# Patient Record
Sex: FEMALE | Race: WHITE | NOT HISPANIC OR LATINO | Employment: FULL TIME | URBAN - METROPOLITAN AREA
[De-identification: names, ages, dates, MRNs, and addresses within clinical notes are randomized per-mention and may not be internally consistent; named-entity substitution may affect disease eponyms.]

---

## 2023-08-31 ENCOUNTER — HOSPITAL ENCOUNTER (INPATIENT)
Facility: HOSPITAL | Age: 63
LOS: 2 days | Discharge: HOME | DRG: 084 | End: 2023-09-02
Attending: EMERGENCY MEDICINE | Admitting: SURGERY
Payer: MEDICARE

## 2023-08-31 DIAGNOSIS — S06.6X3A: Primary | ICD-10-CM

## 2023-08-31 DIAGNOSIS — I48.91 A-FIB: ICD-10-CM

## 2023-08-31 DIAGNOSIS — S02.31XA CLOSED FRACTURE OF RIGHT ORBITAL FLOOR, INITIAL ENCOUNTER: ICD-10-CM

## 2023-08-31 DIAGNOSIS — S02.92XA FACIAL FRACTURE DUE TO FALL, CLOSED, INITIAL ENCOUNTER: ICD-10-CM
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
## 2023-08-31 LAB
ALBUMIN SERPL-MCNC: 3.6 G/DL (ref 3.4–4.8)
ALBUMIN/GLOB SERPL: 1.2 RATIO (ref 1.1–2)
ALP SERPL-CCNC: 67 UNIT/L (ref 40–150)
ALT SERPL-CCNC: 15 UNIT/L (ref 0–55)
APTT PPP: 30 SECONDS (ref 23.2–33.7)
AST SERPL-CCNC: 27 UNIT/L (ref 5–34)
BASOPHILS # BLD AUTO: 0.13 X10(3)/MCL
BASOPHILS NFR BLD AUTO: 1.3 %
BILIRUB SERPL-MCNC: 0.4 MG/DL
BUN SERPL-MCNC: 17.6 MG/DL (ref 9.8–20.1)
CALCIUM SERPL-MCNC: 8.8 MG/DL (ref 8.4–10.2)
CHLORIDE SERPL-SCNC: 113 MMOL/L (ref 98–107)
CO2 SERPL-SCNC: 16 MMOL/L (ref 23–31)
CREAT SERPL-MCNC: 0.86 MG/DL (ref 0.55–1.02)
EOSINOPHIL # BLD AUTO: 0.15 X10(3)/MCL (ref 0–0.9)
EOSINOPHIL NFR BLD AUTO: 1.5 %
ERYTHROCYTE [DISTWIDTH] IN BLOOD BY AUTOMATED COUNT: 15.9 % (ref 11.5–17)
GFR SERPLBLD CREATININE-BSD FMLA CKD-EPI: >60 MLS/MIN/1.73/M2
GLOBULIN SER-MCNC: 2.9 GM/DL (ref 2.4–3.5)
GLUCOSE SERPL-MCNC: 46 MG/DL (ref 82–115)
HCT VFR BLD AUTO: 30.8 % (ref 37–47)
HGB BLD-MCNC: 9.8 G/DL (ref 12–16)
IMM GRANULOCYTES # BLD AUTO: 0.2 X10(3)/MCL (ref 0–0.04)
IMM GRANULOCYTES NFR BLD AUTO: 2 %
INR PPP: 1.1
LYMPHOCYTES # BLD AUTO: 1.53 X10(3)/MCL (ref 0.6–4.6)
LYMPHOCYTES NFR BLD AUTO: 15 %
MCH RBC QN AUTO: 35.4 PG (ref 27–31)
MCHC RBC AUTO-ENTMCNC: 31.8 G/DL (ref 33–36)
MCV RBC AUTO: 111.2 FL (ref 80–94)
MONOCYTES # BLD AUTO: 0.41 X10(3)/MCL (ref 0.1–1.3)
MONOCYTES NFR BLD AUTO: 4 %
NEUTROPHILS # BLD AUTO: 7.75 X10(3)/MCL (ref 2.1–9.2)
NEUTROPHILS NFR BLD AUTO: 76.2 %
NRBC BLD AUTO-RTO: 0 %
PLATELET # BLD AUTO: 391 X10(3)/MCL (ref 130–400)
PMV BLD AUTO: 10.3 FL (ref 7.4–10.4)
POCT GLUCOSE: 72 MG/DL (ref 70–110)
POTASSIUM SERPL-SCNC: 4.7 MMOL/L (ref 3.5–5.1)
PROT SERPL-MCNC: 6.5 GM/DL (ref 5.8–7.6)
PROTHROMBIN TIME: 13.7 SECONDS (ref 12.5–14.5)
RBC # BLD AUTO: 2.77 X10(6)/MCL (ref 4.2–5.4)
SODIUM SERPL-SCNC: 143 MMOL/L (ref 136–145)
WBC # SPEC AUTO: 10.17 X10(3)/MCL (ref 4.5–11.5)

## 2023-08-31 PROCEDURE — 85025 COMPLETE CBC W/AUTO DIFF WBC: CPT | Performed by: EMERGENCY MEDICINE

## 2023-08-31 PROCEDURE — 80053 COMPREHEN METABOLIC PANEL: CPT | Performed by: EMERGENCY MEDICINE

## 2023-08-31 PROCEDURE — 99285 EMERGENCY DEPT VISIT HI MDM: CPT | Mod: 25

## 2023-08-31 PROCEDURE — 25000003 PHARM REV CODE 250: Performed by: SURGERY

## 2023-08-31 PROCEDURE — 85730 THROMBOPLASTIN TIME PARTIAL: CPT | Performed by: EMERGENCY MEDICINE

## 2023-08-31 PROCEDURE — 11000001 HC ACUTE MED/SURG PRIVATE ROOM

## 2023-08-31 PROCEDURE — 25000003 PHARM REV CODE 250: Performed by: NURSE PRACTITIONER

## 2023-08-31 PROCEDURE — 85610 PROTHROMBIN TIME: CPT | Performed by: EMERGENCY MEDICINE

## 2023-08-31 RX ORDER — SODIUM CHLORIDE 9 MG/ML
INJECTION, SOLUTION INTRAVENOUS CONTINUOUS
Status: DISCONTINUED | OUTPATIENT
Start: 2023-08-31 | End: 2023-09-01

## 2023-08-31 RX ORDER — HYDROMORPHONE HYDROCHLORIDE 2 MG/ML
1 INJECTION, SOLUTION INTRAMUSCULAR; INTRAVENOUS; SUBCUTANEOUS EVERY 4 HOURS PRN
Status: DISCONTINUED | OUTPATIENT
Start: 2023-08-31 | End: 2023-09-02 | Stop reason: HOSPADM

## 2023-08-31 RX ORDER — SODIUM CHLORIDE 0.9 % (FLUSH) 0.9 %
10 SYRINGE (ML) INJECTION
Status: DISCONTINUED | OUTPATIENT
Start: 2023-08-31 | End: 2023-09-02 | Stop reason: HOSPADM

## 2023-08-31 RX ORDER — OXYCODONE HYDROCHLORIDE 5 MG/1
5 TABLET ORAL EVERY 4 HOURS PRN
Status: DISCONTINUED | OUTPATIENT
Start: 2023-08-31 | End: 2023-09-02 | Stop reason: HOSPADM

## 2023-08-31 RX ORDER — FAMOTIDINE 10 MG/ML
20 INJECTION INTRAVENOUS 2 TIMES DAILY
Status: DISCONTINUED | OUTPATIENT
Start: 2023-08-31 | End: 2023-08-31

## 2023-08-31 RX ORDER — ONDANSETRON 4 MG/1
8 TABLET, ORALLY DISINTEGRATING ORAL EVERY 8 HOURS PRN
Status: DISCONTINUED | OUTPATIENT
Start: 2023-08-31 | End: 2023-09-02 | Stop reason: HOSPADM

## 2023-08-31 RX ORDER — FAMOTIDINE 10 MG/ML
20 INJECTION INTRAVENOUS DAILY
Status: DISCONTINUED | OUTPATIENT
Start: 2023-09-01 | End: 2023-09-01

## 2023-08-31 RX ORDER — ACETAMINOPHEN 325 MG/1
650 TABLET ORAL EVERY 8 HOURS PRN
Status: DISCONTINUED | OUTPATIENT
Start: 2023-08-31 | End: 2023-09-02 | Stop reason: HOSPADM

## 2023-08-31 RX ORDER — LEVETIRACETAM 500 MG/1
500 TABLET ORAL 2 TIMES DAILY
Status: DISCONTINUED | OUTPATIENT
Start: 2023-08-31 | End: 2023-09-02 | Stop reason: HOSPADM

## 2023-08-31 RX ORDER — ACETAMINOPHEN 325 MG/1
650 TABLET ORAL EVERY 4 HOURS PRN
Status: DISCONTINUED | OUTPATIENT
Start: 2023-08-31 | End: 2023-09-02 | Stop reason: HOSPADM

## 2023-08-31 RX ORDER — LIDOCAINE HYDROCHLORIDE 10 MG/ML
1 INJECTION, SOLUTION EPIDURAL; INFILTRATION; INTRACAUDAL; PERINEURAL ONCE AS NEEDED
Status: DISCONTINUED | OUTPATIENT
Start: 2023-08-31 | End: 2023-09-02 | Stop reason: HOSPADM

## 2023-08-31 RX ORDER — TALC
6 POWDER (GRAM) TOPICAL NIGHTLY PRN
Status: DISCONTINUED | OUTPATIENT
Start: 2023-08-31 | End: 2023-09-02 | Stop reason: HOSPADM

## 2023-08-31 RX ADMIN — FAMOTIDINE 20 MG: 10 INJECTION, SOLUTION INTRAVENOUS at 11:08

## 2023-08-31 RX ADMIN — OXYCODONE HYDROCHLORIDE 5 MG: 5 TABLET ORAL at 08:08

## 2023-08-31 RX ADMIN — ACETAMINOPHEN 650 MG: 325 TABLET, FILM COATED ORAL at 10:08

## 2023-08-31 RX ADMIN — LEVETIRACETAM 500 MG: 500 TABLET, FILM COATED ORAL at 08:08

## 2023-08-31 RX ADMIN — ACETAMINOPHEN 650 MG: 325 TABLET, FILM COATED ORAL at 06:08

## 2023-08-31 RX ADMIN — Medication 6 MG: at 08:08

## 2023-08-31 RX ADMIN — SODIUM CHLORIDE: 9 INJECTION, SOLUTION INTRAVENOUS at 10:08

## 2023-08-31 NOTE — ED PROVIDER NOTES
Encounter Date: 8/31/2023    SCRIBE #1 NOTE: I, Guerrero Stephen, am scribing for, and in the presence of,  Hunter Cho MD. I have scribed the following portions of the note - Other sections scribed: HPI, ROS, PE, MDM.       History     Chief Complaint   Patient presents with    Fall     Transfer from [facility]. GLF on plavix +LOC with multp fall lastn ight. Hematoma to R forehead. SAH, R orbital Fx with sinus wall fx.      A 64 y/o female who is status post vascular surgery 1 week presents to [facility] ED as a transfer from [place] with a subarachnoid hemorrhage and facial fractures secondary to a fall on Plavix while intoxicated at about 1900 last night.   Record review shows that the patient has had multiple falls on Plavix and that the patient sustained small lacerations and contusions to the forehead and left occiput.      The history is provided by the patient and medical records. No  was used.     Review of patient's allergies indicates:   Allergen Reactions    Codeine Other (See Comments)     History reviewed. No pertinent past medical history.  History reviewed. No pertinent surgical history.  History reviewed. No pertinent family history.     Review of Systems   Constitutional:  Negative for chills, fatigue and fever.   HENT:  Negative for congestion and sore throat.         Occipital pain and facial pain   Eyes:  Negative for visual disturbance.   Respiratory:  Negative for cough and shortness of breath.    Cardiovascular:  Negative for chest pain.   Gastrointestinal:  Negative for abdominal pain, diarrhea, nausea and vomiting.   Genitourinary:  Negative for dysuria.   Musculoskeletal:  Negative for myalgias.   Skin:  Negative for rash.        Contusions to the forehead and face   Neurological:  Negative for weakness, numbness and headaches.       Physical Exam     Initial Vitals [08/31/23 0815]   BP Pulse Resp Temp SpO2   122/76 74 (!) 21 96.3 °F (35.7 °C) 100 %      MAP       --          Physical Exam    Nursing note and vitals reviewed.  Constitutional: No distress.   HENT:   Head: Normocephalic.   Right Ear: Tympanic membrane normal.   Left Ear: Tympanic membrane normal.   Mouth/Throat: Oropharynx is clear and moist.   Patient has tenderness to palpation tot he right temple, right forehead, right cheek bone, and left occiput.    Eyes: Conjunctivae and EOM are normal. Pupils are equal, round, and reactive to light.   Neck: Trachea normal. Neck supple. Carotid bruit is not present. No JVD present.   Normal range of motion.  Cardiovascular:  Normal rate and regular rhythm.     Exam reveals decreased pulses.       No murmur heard.  Pulmonary/Chest: Breath sounds normal. No respiratory distress. She exhibits no tenderness.   Abdominal: Abdomen is soft. Bowel sounds are normal. She exhibits no distension. There is no abdominal tenderness.   Musculoskeletal:         General: Normal range of motion.      Cervical back: Normal range of motion and neck supple.      Lumbar back: Normal. Normal range of motion.      Comments: Patient has no cervical, thoracic, and lumbar tenderness.      Neurological: She is alert and oriented to person, place, and time. She has normal strength. No cranial nerve deficit or sensory deficit.   Patient has 5/5 strength to all extremities.    Skin:   Patient has old bruising to bilateral forearms. Patient's feet are cool to the touch.   Patient has bilateral 4cm inguinal incisions that are clean, dry, and intact.    Psychiatric: She has a normal mood and affect.         ED Course   Procedures  Labs Reviewed   CBC WITH DIFFERENTIAL - Abnormal; Notable for the following components:       Result Value    RBC 2.77 (*)     Hgb 9.8 (*)     Hct 30.8 (*)     .2 (*)     MCH 35.4 (*)     MCHC 31.8 (*)     IG# 0.20 (*)     All other components within normal limits   CBC W/ AUTO DIFFERENTIAL    Narrative:     The following orders were created for panel order CBC auto  differential.  Procedure                               Abnormality         Status                     ---------                               -----------         ------                     CBC with Differential[[id number]]        Abnormal            Final result                 Please view results for these tests on the individual orders.   COMPREHENSIVE METABOLIC PANEL   APTT   PROTIME-INR          Imaging Results    None          Medications - No data to display  Medical Decision Making  Differential diagnosis includes but is not limited to facial fractures, subarachnoid hemorrhage, or alcohol abuse, falls, blunt head injury, subdural hematoma    Amount and/or Complexity of Data Reviewed  External Data Reviewed: labs, radiology and notes.     Details: Radiology reports from [facility] show trace subarachnoid hemorrhage on 1 image,   CT of the C-spine without any acute findings   CT of the face showed right orbital floor maxillary sinus wall fracture  Labs: ordered.     Details: Mild anemia  Discussion of management or test interpretation with external provider(s): Discussed with neurosurgery   Discussed with Trauma surgery, will admit    Risk  Decision regarding hospitalization.    Critical Care  Total time providing critical care: 0 minutes            Scribe Attestation:   Scribe #1: I performed the above scribed service and the documentation accurately describes the services I performed. I attest to the accuracy of the note.    Attending Attestation:           Physician Attestation for Scribe:  Physician Attestation Statement for Scribe #1: I, Hunter Cho MD, reviewed documentation, as scribed by Guerrero Mitchell in my presence, and it is both accurate and complete.             ED Course as of 08/31/23 0939   u Aug 31, 2023   0858 Patient's GCS is 15, neurologically intact.  Paged neurosurgery [MP]      ED Course User Index  [MP] Hunter Cho MD                    Clinical Impression:    Final diagnoses:  [S06.6X3A] Traumatic subarachnoid hemorrhage with loss of consciousness of 1 hour to 5 hours 59 minutes, initial encounter (Primary)  [S02.92XA, W19.XXXA] Facial fracture due to fall, closed, initial encounter        ED Disposition Condition    Admit Stable                Hunter Cho MD  08/31/23 0924

## 2023-08-31 NOTE — CONSULTS
[facility] - Emergency Dept  Neurosurgery  Consult Note    Inpatient consult to Neurosurgery  Consult performed by: Derrek George AGACNP-BC  Consult ordered by: Hunter Cho MD        Subjective:     Chief Complaint/Reason for Admission:  Ground level fall patient has been on Plavix for 2 weeks.  Positive LOC with multiple falls last night, intoxicated.  Subarachnoid hemorrhage and right orbital fracture.    History of Present Illness:  This is a 63-year-old  female with past medical history significant for vascular surgery approximately 2 weeks ago and on Plavix who was a transfer here from [place] with subarachnoid hemorrhage and facial fractures.  Patient had several falls last night and was intoxicated.      CT head without contrast from [facility] demonstrates trace subarachnoid hemorrhage.  C-spine without any acute findings.  CT of the face demonstrated right orbital floor maxillary sinus wall fracture.    PT INR 13.7 and 1.1.  PTT 30.    (Not in a hospital admission)      Review of patient's allergies indicates:   Allergen Reactions    Codeine Other (See Comments)       History reviewed. No pertinent past medical history.  History reviewed. No pertinent surgical history.  Family History    None       Tobacco Use    Smoking status: Not on file    Smokeless tobacco: Not on file   Substance and Sexual Activity    Alcohol use: Not on file    Drug use: Not on file    Sexual activity: Not on file     Review of Systems   Neurological:  Positive for headaches.   Hematological:  Bruises/bleeds easily.     Objective:     Weight: 52.2 kg (115 lb)  Body mass index is 22.46 kg/m².  Vital Signs (Most Recent):  Temp: 96.3 °F (35.7 °C) (08/31/23 0815)  Pulse: 78 (08/31/23 0834)  Resp: 17 (08/31/23 0834)  BP: 115/78 (08/31/23 0834)  SpO2: 96 % (08/31/23 0834) Vital Signs (24h Range):  Temp:  [96.3 °F (35.7 °C)] 96.3 °F (35.7 °C)  Pulse:  [74-78] 78  Resp:  [17-21] 17  SpO2:   "[96 %-100 %] 96 %  BP: (115-122)/(76-78) 115/78                              Physical Exam:  Nursing note and vitals reviewed.    Constitutional: She appears well-developed and well-nourished. She is not diaphoretic. No distress.     Eyes: Pupils are equal, round, and reactive to light. Conjunctivae and EOM are normal.     Cardiovascular: Normal rate.     Abdominal: Soft. Bowel sounds are normal.     Skin: Skin displays no rash on trunk and no rash on extremities. Skin displays no lesions on trunk and no lesions on extremities.     Psych/Behavior: She is alert. She is oriented to person, place, and time. She has a normal mood and affect.     Musculoskeletal:        Right Upper Extremities: Muscle strength is 5/5.        Left Upper Extremities: Muscle strength is 5/5.       Right Lower Extremities: Muscle strength is 5/5.        Left Lower Extremities: Muscle strength is 5/5.     Neurological:        Sensory: There is no sensory deficit in the trunk. There is no sensory deficit in the extremities.        DTRs: DTRs are DTRS NORMAL AND SYMMETRICnormal and symmetric. She displays no Babinski's sign on the right side. She displays no Babinski's sign on the left side.        Cranial nerves: Cranial nerve(s) II, III, IV, V, VI, VII, VIII, IX, X, XI and XII are intact.   GCS is 15.  Fully oriented to all spheres.    PERRLA bilateral brisk.  Hematoma over right eye.    Motor strength is 5/5 grossly to all extremities with no sensory deficits.    No pronator drift.    Speech is clear.  No facial droop.    Patient does endorse a mild headache.       Significant Labs:  No results for input(s): "GLU", "NA", "K", "CL", "CO2", "BUN", "CREATININE", "CALCIUM", "MG" in the last 48 hours.  Recent Labs   Lab 08/31/23  0903   WBC 10.17   HGB 9.8*   HCT 30.8*        No results for input(s): "LABPT", "INR", "APTT" in the last 48 hours.  Microbiology Results (last 7 days)       ** No results found for the last 168 hours. **      "       Assessment/Plan:    Traumatic subarachnoid hemorrhage with loss of consciousness  Patient on Plavix for recent vascular procedure approximately 1-2 weeks ago.    Patient will be admitted to the floor with every 2 hour neurological exams  Seizure precautions   Fall precautions  Blood pressure less than 160/90.  SCDs     No acute neurosurgical interventions indicated at this time.     There are no hospital problems to display for this patient.      Thank you for your consult. I will follow-up with patient. Please contact us if you have any additional questions.    Derrek George [facility]-BC  Neurosurgery  [facility] - Emergency Dept

## 2023-08-31 NOTE — PROGRESS NOTES
Pharmacist Renal Dose Adjustment Note    Carie Cuevas is a 63 y.o. female being treated with the medication famotidine    Patient Data:    Vital Signs (Most Recent):  Temp: 98.3 °F (36.8 °C) (08/31/23 1211)  Pulse: 80 (08/31/23 1211)  Resp: 18 (08/31/23 1211)  BP: (!) 150/70 (08/31/23 1211)  SpO2: 96 % (08/31/23 0834) Vital Signs (72h Range):  Temp:  [96.3 °F (35.7 °C)-98.3 °F (36.8 °C)]   Pulse:  [74-82]   Resp:  [17-21]   BP: (112-150)/(70-84)   SpO2:  [96 %-100 %]      Recent Labs   Lab 08/31/23  0903   CREATININE 0.86     Serum creatinine: 0.86 mg/dL 08/31/23 0903  Estimated creatinine clearance: 46.8 mL/min    Medication:famotidine dose: 20mg frequency q12h will be changed to medication:famotidine dose:20mg frequency:q24h based on CrCl = 46.8 mL/min.    Pharmacist's Name: Rhoda Levine  Pharmacist's Extension: [phone]

## 2023-08-31 NOTE — NURSING
Nurses Note -- 4 Eyes      8/31/2023   12:32 PM      Skin assessed during: Transfer      [x] No Altered Skin Integrity Present    []Prevention Measures Documented      [] Yes- Altered Skin Integrity Present or Discovered   [] LDA Added if Not in Epic (Describe Wound)   [] New Altered Skin Integrity was Present on Admit and Documented in LDA   [] Wound Image Taken    Wound Care Consulted? No    Attending Nurse:  Slime Barton Rn    Second RN/Staff Member:   Maryann Holman RN

## 2023-08-31 NOTE — H&P
Trauma Surgery   History and Physical Note    Patient Name: Carie Cuevas  YOB: 1960  Date: 08/31/2023 2:34 PM  Date of Admission: 8/31/2023  HD#0  POD#* No surgery found *    PRESENTING HISTORY   Chief Complaint/Reason for Admission: <principal problem not specified>    History of Present Illness:  Patient is a 63-year-old female with a past medical history of HTN, PAD, anxiety, AFib who tripped and fell in her home earlier today.  She reports loss of consciousness, headache and nausea following fall, no emesis.  Denies feeling weak or off balance before falling.  She underwent femoral femoral bypass 2 weeks ago with Dr. Lynn in [place], and has been on plavix since surgery.  She reports mild difficulty ambulating since the surgery.  She also states she has pain in her leg when sitting down.    Review of Systems:  12 point ROS negative except as stated in HPI    PAST HISTORY:   Past medical history:  Hypertension   PID   Anxiety   AFib    Past surgical history:  Femoral femoral bypass  Hysterectomy  Appendectomy  I&D of gluteal abscess    Family history:  History reviewed. No pertinent family history.    Social history:  Social History     Socioeconomic History    Marital status: Single     Social History     Tobacco Use   Smoking Status Not on file   Smokeless Tobacco Not on file      Social History     Substance and Sexual Activity   Alcohol Use None        MEDICATIONS & ALLERGIES:   Allergies:   Review of patient's allergies indicates:   Allergen Reactions    Codeine Other (See Comments)     Home Meds:   Metoprolol  Zoloft  ASA  plavix    Scheduled Meds:   [START ON 9/1/2023] famotidine (PF)  20 mg Intravenous Daily     Continuous Infusions:   sodium chloride 0.9% 75 mL/hr at 08/31/23 1029     PRN Meds:acetaminophen, acetaminophen, HYDROmorphone, LIDOcaine (PF) 10 mg/ml (1%), melatonin, ondansetron, oxyCODONE, sodium chloride 0.9%    OBJECTIVE:   Vital Signs:  VITAL SIGNS: 24 HR MIN & MAX  "LAST   Temp  Min: 96.3 °F (35.7 °C)  Max: 98.3 °F (36.8 °C)  98.2 °F (36.8 °C)   BP  Min: 112/84  Max: 150/70  (!) 150/70    Pulse  Min: 74  Max: 82  80    Resp  Min: 17  Max: 21  18    SpO2  Min: 96 %  Max: 100 %  98 %      HT: 5' (152.4 cm)  WT: 52.2 kg (115 lb)  BMI: 22.5   Intake/output: No intake/output data recorded.     Lines/drains/airway:       Peripheral IV - Single Lumen 20 G Left Antecubital (Active)   Site Assessment Clean;Dry;Intact 08/31/23 0833   Number of days:        Physical Exam:  General:  Well developed, well nourished, no acute distress  HEENT:  Ecchymosis to right forehead, cheek, periorbital ecchymosis  CV:  RR, 2+ DPs bilaterally  Resp/chest: NWOB  GI:  Abdomen soft, non-tender, non-distended  :  Deferred  MSK:  No muscle atrophy, cyanosis, peripheral edema, moving all extremities spontaneously  Neuro: GCS 15. CNII-XII grossly intact, alert and oriented to person, place, and time. Strength and motor function grossly intact to all extremities, sensation intact to all extremities.  Skin/Wounds:  Bilateral groin incisions clean dry and intact    Labs:  Troponin:  No results for input(s): "TROPONINI" in the last 72 hours.  CBC:  Recent Labs     08/31/23  0903   WBC 10.17   RBC 2.77*   HGB 9.8*   HCT 30.8*      .2*   MCH 35.4*   MCHC 31.8*     CMP:  Recent Labs     08/31/23  0903   CALCIUM 8.8   ALBUMIN 3.6      K 4.7   CO2 16*   BUN 17.6   CREATININE 0.86   ALKPHOS 67   ALT 15   AST 27   BILITOT 0.4     Lactic Acid:  No results for input(s): "LACTATE" in the last 72 hours.  ETOH:  No results for input(s): "ETHANOL" in the last 72 hours.   Urine Drug Screen:  No results for input(s): "COCAINE", "OPIATE", "BARBITURATE", "AMPHETAMINE", "FENTANYL", "CANNABINOIDS", "MDMA" in the last 72 hours.    Invalid input(s): "BENZODIAZEPINE", "PHENCYCLIDINE"   ABG  No results for input(s): "PH", "PO2", "PCO2", "HCO3", "BE" in the last 168 hours.      Diagnostic Results:  CT Head Without " Contrast   Final Result      Questionable trace subarachnoid hemorrhage which is similar to prior.  No sizable intracranial hemorrhage.         Electronically signed by: Juliano Green   Date:    08/31/2023   Time:    12:13      CT Previous   Final Result          ASSESSMENT & PLAN:    63 year old female with PMHx of HTN, PAD, afib, anxiety with fall from standing resulting in trace SAH, maxillary sinus, orbital floor fxs. Hx of fem-fem bypass 2 weeks ago. On plavix and ASA.     - admit to trauma floor  - appreciate neurosurgery recs  - q2h neuro checks  - BP goals < 160/90  - seizure, fall precautions  - holding Lov, ASA, plavix  - f/u PRS recs for facial fxs  - SCDs  - MM pain    To Horvath MD  General Surgery  8/31/2023 3:01 PM

## 2023-08-31 NOTE — NURSING
Attempted twice to call patients pharmacy for med rec.  [facility]: [phone], no answer, unable to leave message.

## 2023-09-01 PROBLEM — S02.30XA ORBITAL FLOOR FRACTURE: Status: ACTIVE | Noted: 2023-09-01
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
## 2023-09-01 PROBLEM — I60.9 SAH (SUBARACHNOID HEMORRHAGE): Status: ACTIVE | Noted: 2023-09-01

## 2023-09-01 PROBLEM — S02.401A MAXILLARY SINUS FRACTURE: Status: ACTIVE | Noted: 2023-09-01

## 2023-09-01 LAB
ANION GAP SERPL CALC-SCNC: 10 MEQ/L
BASOPHILS # BLD AUTO: 0.1 X10(3)/MCL
BASOPHILS NFR BLD AUTO: 1.5 %
BUN SERPL-MCNC: 19.1 MG/DL (ref 9.8–20.1)
CALCIUM SERPL-MCNC: 8.4 MG/DL (ref 8.4–10.2)
CHLORIDE SERPL-SCNC: 108 MMOL/L (ref 98–107)
CO2 SERPL-SCNC: 19 MMOL/L (ref 23–31)
CREAT SERPL-MCNC: 0.79 MG/DL (ref 0.55–1.02)
CREAT/UREA NIT SERPL: 24
EOSINOPHIL # BLD AUTO: 0.22 X10(3)/MCL (ref 0–0.9)
EOSINOPHIL NFR BLD AUTO: 3.3 %
ERYTHROCYTE [DISTWIDTH] IN BLOOD BY AUTOMATED COUNT: 15.7 % (ref 11.5–17)
GFR SERPLBLD CREATININE-BSD FMLA CKD-EPI: >60 MLS/MIN/1.73/M2
GLUCOSE SERPL-MCNC: 80 MG/DL (ref 82–115)
HCT VFR BLD AUTO: 29.9 % (ref 37–47)
HGB BLD-MCNC: 9.9 G/DL (ref 12–16)
IMM GRANULOCYTES # BLD AUTO: 0.06 X10(3)/MCL (ref 0–0.04)
IMM GRANULOCYTES NFR BLD AUTO: 0.9 %
INR PPP: 1
LYMPHOCYTES # BLD AUTO: 1.34 X10(3)/MCL (ref 0.6–4.6)
LYMPHOCYTES NFR BLD AUTO: 20 %
MCH RBC QN AUTO: 36.1 PG (ref 27–31)
MCHC RBC AUTO-ENTMCNC: 33.1 G/DL (ref 33–36)
MCV RBC AUTO: 109.1 FL (ref 80–94)
MONOCYTES # BLD AUTO: 0.53 X10(3)/MCL (ref 0.1–1.3)
MONOCYTES NFR BLD AUTO: 7.9 %
NEUTROPHILS # BLD AUTO: 4.45 X10(3)/MCL (ref 2.1–9.2)
NEUTROPHILS NFR BLD AUTO: 66.4 %
NRBC BLD AUTO-RTO: 0 %
PLATELET # BLD AUTO: 304 X10(3)/MCL (ref 130–400)
PMV BLD AUTO: 10.4 FL (ref 7.4–10.4)
POTASSIUM SERPL-SCNC: 4.1 MMOL/L (ref 3.5–5.1)
PROTHROMBIN TIME: 12.9 SECONDS (ref 12.5–14.5)
RBC # BLD AUTO: 2.74 X10(6)/MCL (ref 4.2–5.4)
SODIUM SERPL-SCNC: 137 MMOL/L (ref 136–145)
WBC # SPEC AUTO: 6.7 X10(3)/MCL (ref 4.5–11.5)

## 2023-09-01 PROCEDURE — 25000003 PHARM REV CODE 250: Performed by: SURGERY

## 2023-09-01 PROCEDURE — 99232 SBSQ HOSP IP/OBS MODERATE 35: CPT | Mod: FS,,, | Performed by: NEUROLOGICAL SURGERY

## 2023-09-01 PROCEDURE — 85610 PROTHROMBIN TIME: CPT | Performed by: SURGERY

## 2023-09-01 PROCEDURE — 99223 1ST HOSP IP/OBS HIGH 75: CPT | Mod: ,,, | Performed by: SURGERY

## 2023-09-01 PROCEDURE — 63600175 PHARM REV CODE 636 W HCPCS: Performed by: NURSE PRACTITIONER

## 2023-09-01 PROCEDURE — 80048 BASIC METABOLIC PNL TOTAL CA: CPT | Performed by: SURGERY

## 2023-09-01 PROCEDURE — 85025 COMPLETE CBC W/AUTO DIFF WBC: CPT | Performed by: SURGERY

## 2023-09-01 PROCEDURE — 99223 PR INITIAL HOSPITAL CARE,LEVL III: ICD-10-PCS | Mod: ,,, | Performed by: SURGERY

## 2023-09-01 PROCEDURE — 25000003 PHARM REV CODE 250: Performed by: NURSE PRACTITIONER

## 2023-09-01 PROCEDURE — 63600175 PHARM REV CODE 636 W HCPCS: Performed by: SURGERY

## 2023-09-01 PROCEDURE — 99232 PR SUBSEQUENT HOSPITAL CARE,LEVL II: ICD-10-PCS | Mod: FS,,, | Performed by: NEUROLOGICAL SURGERY

## 2023-09-01 PROCEDURE — 11000001 HC ACUTE MED/SURG PRIVATE ROOM

## 2023-09-01 RX ORDER — DIPHENHYDRAMINE HYDROCHLORIDE 50 MG/ML
25 INJECTION INTRAMUSCULAR; INTRAVENOUS EVERY 6 HOURS PRN
Status: DISCONTINUED | OUTPATIENT
Start: 2023-09-01 | End: 2023-09-02 | Stop reason: HOSPADM

## 2023-09-01 RX ORDER — DOCUSATE SODIUM 50 MG/5ML
100 LIQUID ORAL 2 TIMES DAILY
Status: DISCONTINUED | OUTPATIENT
Start: 2023-09-01 | End: 2023-09-02 | Stop reason: HOSPADM

## 2023-09-01 RX ORDER — POLYETHYLENE GLYCOL 3350 17 G/17G
17 POWDER, FOR SOLUTION ORAL 2 TIMES DAILY
Status: DISCONTINUED | OUTPATIENT
Start: 2023-09-01 | End: 2023-09-02 | Stop reason: HOSPADM

## 2023-09-01 RX ORDER — CLOPIDOGREL BISULFATE 75 MG/1
75 TABLET ORAL DAILY
Status: DISCONTINUED | OUTPATIENT
Start: 2023-09-01 | End: 2023-09-02 | Stop reason: HOSPADM

## 2023-09-01 RX ADMIN — POLYETHYLENE GLYCOL 3350 17 G: 17 POWDER, FOR SOLUTION ORAL at 11:09

## 2023-09-01 RX ADMIN — ACETAMINOPHEN 650 MG: 325 TABLET, FILM COATED ORAL at 03:09

## 2023-09-01 RX ADMIN — POLYETHYLENE GLYCOL 3350 17 G: 17 POWDER, FOR SOLUTION ORAL at 08:09

## 2023-09-01 RX ADMIN — DIPHENHYDRAMINE HYDROCHLORIDE 25 MG: 50 INJECTION INTRAMUSCULAR; INTRAVENOUS at 11:09

## 2023-09-01 RX ADMIN — CLOPIDOGREL BISULFATE 75 MG: 75 TABLET ORAL at 11:09

## 2023-09-01 RX ADMIN — ONDANSETRON 8 MG: 4 TABLET, ORALLY DISINTEGRATING ORAL at 10:09

## 2023-09-01 RX ADMIN — LEVETIRACETAM 500 MG: 500 TABLET, FILM COATED ORAL at 08:09

## 2023-09-01 RX ADMIN — HYDROMORPHONE HYDROCHLORIDE 1 MG: 2 INJECTION INTRAMUSCULAR; INTRAVENOUS; SUBCUTANEOUS at 10:09

## 2023-09-01 RX ADMIN — OXYCODONE HYDROCHLORIDE 5 MG: 5 TABLET ORAL at 08:09

## 2023-09-01 RX ADMIN — OXYCODONE HYDROCHLORIDE 5 MG: 5 TABLET ORAL at 12:09

## 2023-09-01 RX ADMIN — DOCUSATE SODIUM LIQUID 100 MG: 100 LIQUID ORAL at 08:09

## 2023-09-01 RX ADMIN — OXYCODONE HYDROCHLORIDE 5 MG: 5 TABLET ORAL at 03:09

## 2023-09-01 RX ADMIN — DOCUSATE SODIUM LIQUID 100 MG: 100 LIQUID ORAL at 11:09

## 2023-09-01 RX ADMIN — SODIUM CHLORIDE: 9 INJECTION, SOLUTION INTRAVENOUS at 05:09

## 2023-09-01 RX ADMIN — HYDROMORPHONE HYDROCHLORIDE 1 MG: 2 INJECTION INTRAMUSCULAR; INTRAVENOUS; SUBCUTANEOUS at 03:09

## 2023-09-01 RX ADMIN — FAMOTIDINE 20 MG: 10 INJECTION, SOLUTION INTRAVENOUS at 08:09

## 2023-09-01 NOTE — CONSULTS
[facility] Neuro  Plastic Surgery  Consult Note    Patient Name: Carie Cuevas  MRN: [id number]  Code Status: Full Code  Admission Date: 8/31/2023  Hospital Length of Stay: 1 days  Attending Physician: Roberto Elizabeth MD  Primary Care Provider: Cheryl, Primary Doctor    Consults  Subjective:     Chief Complaint/Reason for Admission: fall    History of Present Illness: A 62 y/o female who is status post vascular surgery 1 week presents to [facility] ED as a transfer from [place] with a subarachnoid hemorrhage and facial fractures secondary to a fall on Plavix while intoxicated at about 1900 last night.   Record review shows that the patient has had multiple falls on Plavix and that the patient sustained small lacerations and contusions to the forehead and left occiput.    I have been asked to evaluate from a facial trauma standpoint,    No current facility-administered medications on file prior to encounter.     No current outpatient medications on file prior to encounter.       Review of patient's allergies indicates:   Allergen Reactions    Codeine Other (See Comments)       History reviewed. No pertinent past medical history.  History reviewed. No pertinent surgical history.  Family History    None       Tobacco Use    Smoking status: Not on file    Smokeless tobacco: Not on file   Substance and Sexual Activity    Alcohol use: Not on file    Drug use: Not on file    Sexual activity: Not on file     Review of Systems   All other systems reviewed and are negative.    Objective:     Vital Signs (Most Recent):  Temp: 98 °F (36.7 °C) (09/01/23 0519)  Pulse: 69 (09/01/23 0519)  Resp: 17 (09/01/23 0519)  BP: 135/81 (09/01/23 0519)  SpO2: 98 % (09/01/23 0519) Vital Signs (24h Range):  Temp:  [96.3 °F (35.7 °C)-99 °F (37.2 °C)] 98 °F (36.7 °C)  Pulse:  [69-89] 69  Resp:  [16-21] 17  SpO2:  [96 %-100 %] 98 %  BP: (112-165)/(70-84) 135/81     Weight: 52.2 kg (115 lb)  Body mass index is 22.46  kg/m².      Intake/Output Summary (Last 24 hours) at 9/1/2023 0744  Last data filed at 9/1/2023 0348  Gross per 24 hour   Intake --   Output 300 ml   Net -300 ml       Physical Exam  Vitals reviewed.   HENT:      Head: Normocephalic.      Comments: Right periorbital swelling     Right Ear: External ear normal.      Left Ear: External ear normal.      Nose: Nose normal.      Mouth/Throat:      Mouth: Mucous membranes are moist.      Comments: Mandible non tender  Eyes:      Extraocular Movements: Extraocular movements intact.      Conjunctiva/sclera: Conjunctivae normal.   Cardiovascular:      Rate and Rhythm: Normal rate.      Pulses: Normal pulses.   Pulmonary:      Effort: Pulmonary effort is normal.   Abdominal:      General: Abdomen is flat.   Musculoskeletal:         General: Normal range of motion.      Cervical back: Neck supple.   Skin:     General: Skin is warm.      Capillary Refill: Capillary refill takes less than 2 seconds.   Neurological:      Mental Status: She is alert. Mental status is at baseline.   Psychiatric:         Mood and Affect: Mood normal.         Significant Labs:  All pertinent labs from the last 24 hours have been reviewed.    Significant Diagnostics:  CT: I have reviewed all pertinent results/findings within the past 24 hours. Very small right orbital floor and right maxillary sinus fractures    Assessment/Plan:    Right orbital floor and right maxillary sinus fractures are very small and non operative.  She will not require any treatment for theses injuries.  Suggest outpatient ophthalmology evaluation for formal eye exam.   Follow up with me as needed.     Active Diagnoses:    Diagnosis Date Noted POA    PRINCIPAL PROBLEM:  Fall [W19.XXXA] 09/01/2023 Yes    SAH (subarachnoid hemorrhage) [I60.9] 09/01/2023 Yes    Orbital floor fracture [S02.30XA] 09/01/2023 Yes    Maxillary sinus fracture [S02.401A] 09/01/2023 Yes      Problems Resolved During this Admission:       Thank you for your  consult. I will sign off. Please contact us if you have any additional questions.    Seamus Chambers MD  Plastic Surgery  [facility] - [facility] Neuro

## 2023-09-01 NOTE — PLAN OF CARE
"Brief intervention completed with pt whose ETOH was 229 on admit coming from [facility].   Pt confirms she drinks when she is not working. She was doing shots of Tequila. She is unsure how many . Pt did accept "Rethinking Drinking"  Pt will return home when discharged, she works from home as part of an [employer] company. Pt is unsure if she will have a ride and will be checking with his sisters and nephew. She has her cell phone with her and key to her home at [address]  in [place]. Nursing aware to set up uber if pt is not able to find transport.   PCP is  at [facility]  Pt denies needs and declines home health   Confirms her family will be able to assist with shopping etc and getting her settled once she arrives home.   "

## 2023-09-01 NOTE — PROGRESS NOTES
[facility]  Neurosurgery  Progress Note    Subjective:     Interval History: She is lying in bed, NAD. She does have a mild HA, she denies N/V and blurred vision. She c/o pain around the right eye. She has no other complaints.    History of Present Illness: This is a 63-year-old  female with past medical history significant for vascular surgery approximately 2 weeks ago and on Plavix, who was a transfer here from [place] on 8/31 with subarachnoid hemorrhage and facial fractures.  Patient had several falls the night prior and was intoxicated.       CT head without contrast from [facility] demonstrates trace subarachnoid hemorrhage.  C-spine without any acute findings.  CT of the face demonstrated right orbital floor maxillary sinus wall fracture.    Post-Op Info:  * No surgery found *          Medications:  Continuous Infusions:  Scheduled Meds:   famotidine (PF)  20 mg Intravenous Daily    levETIRAcetam  500 mg Oral BID     PRN Meds:acetaminophen, acetaminophen, HYDROmorphone, LIDOcaine (PF) 10 mg/ml (1%), melatonin, ondansetron, oxyCODONE, sodium chloride 0.9%     Review of Systems  Objective:     Weight: 52.2 kg (115 lb)  Body mass index is 22.46 kg/m².  Vital Signs (Most Recent):  Temp: 98 °F (36.7 °C) (09/01/23 0749)  Pulse: 77 (09/01/23 0749)  Resp: 18 (09/01/23 0848)  BP: (!) 177/108 (09/01/23 0749)  SpO2: 99 % (09/01/23 0749) Vital Signs (24h Range):  Temp:  [97.8 °F (36.6 °C)-99 °F (37.2 °C)] 98 °F (36.7 °C)  Pulse:  [69-89] 77  Resp:  [16-19] 18  SpO2:  [96 %-99 %] 99 %  BP: (112-177)/() 177/108     Date 09/01/23 0700 - 09/02/23 0659   Shift 6719-8321 4750-8256 4698-4857 24 Hour Total   INTAKE   P.O. 220   220   Shift Total(mL/kg) 220(4.2)   220(4.2)   OUTPUT   Shift Total(mL/kg)       Weight (kg) 52.2 52.2 52.2 52.2        Neurosurgery Physical Exam  Nursing note and vitals reviewed.     Constitutional: She appears well-developed and  well-nourished. She is not diaphoretic. No distress.      Eyes: Pupils are equal, round, and reactive to light. Conjunctivae and EOM are normal. Right periorbital swelling and bruising noted.     Cardiovascular: Normal rate.      Abdominal: Soft. Bowel sounds are normal.      Skin: Skin displays no rash on trunk and no rash on extremities. Skin displays no lesions on trunk and no lesions on extremities.      Psych/Behavior: She is alert. She is oriented to person, place, and time. Speech clear. She has a normal mood and affect. GCS 15.     Musculoskeletal:        Right Upper Extremities: Muscle strength is 5/5.        Left Upper Extremities: Muscle strength is 5/5.       Right Lower Extremities: Muscle strength is 5/5.        Left Lower Extremities: Muscle strength is 5/5.      Neurological:        Sensory: There is no sensory deficit in the trunk. There is no sensory deficit in the extremities.        DTRs: DTRs are DTRS NORMAL AND SYMMETRICnormal and symmetric. She displays no Babinski's sign on the right side. She displays no Babinski's sign on the left side.        Cranial nerves: Cranial nerve(s) II, III, IV, V, VI, VII, VIII, IX, X, XI and XII are intact.     Significant Labs:  Recent Labs   Lab 08/31/23 0903 09/01/23  0426    137   K 4.7 4.1   CO2 16* 19*   BUN 17.6 19.1   CREATININE 0.86 0.79   CALCIUM 8.8 8.4     Recent Labs   Lab 08/31/23 0903 09/01/23  0553   WBC 10.17 6.70   HGB 9.8* 9.9*   HCT 30.8* 29.9*    304     Recent Labs   Lab 08/31/23 0903 09/01/23  0426   INR 1.1 1.0     Microbiology Results (last 7 days)       ** No results found for the last 168 hours. **            Significant Diagnostics:      Assessment/Plan:     Active Diagnoses:    Diagnosis Date Noted POA    PRINCIPAL PROBLEM:  Fall [W19.XXXA] 09/01/2023 Yes    SAH (subarachnoid hemorrhage) [I60.9] 09/01/2023 Yes    Orbital floor fracture [S02.30XA] 09/01/2023 Yes    Maxillary sinus fracture [S02.401A] 09/01/2023 Yes       Problems Resolved During this Admission:     She is GCS 15 with c/o HA, controlled.   Repeat CT head yesterday was stable; trace SAH  No neurosurgical interventions indicated  She is ok to resume Plavix as she recently had a fem-fem bypass. Risks discussed with patient.  We will sign off. Please call with any questions.    NATHANIEL Hernandez  Neurosurgery  [facility]

## 2023-09-01 NOTE — TERTIARY TRAUMA SURVEY NOTE
TERTIARY TRAUMA SURVEY (TTS)    List Injuries Identified to Date:   1. Orbital sinus fractures  2.  Subarachnoid hemorrhage    List Operations and Procedures:   1. None    History reviewed. No pertinent surgical history.    Incidental findings:   1. None.  I have reviewed all read from outside hospital.  This includes head and cervical spine images.    Past Medical History:   1. Recent fem-fem bypass for PVD   2. Paroxysmal AFib   3. Hypertension  4. Long-term smoker     Active Ambulatory Problems     Diagnosis Date Noted    No Active Ambulatory Problems     Resolved Ambulatory Problems     Diagnosis Date Noted    No Resolved Ambulatory Problems     No Additional Past Medical History     History reviewed. No pertinent past medical history.    Tertiary Physical Exam:     Physical Exam  Constitutional:       Appearance: Normal appearance.   HENT:      Head: Normocephalic and atraumatic.      Nose: Nose normal.   Eyes:      Pupils: Pupils are equal, round, and reactive to light.      Comments: Periorbital hematoma around the right eye.  Extra ocular movement and remains intact.  Vision grossly intact.   Cardiovascular:      Rate and Rhythm: Normal rate.      Pulses: Normal pulses.      Comments: Normal peripheral pulses  Pulmonary:      Effort: Pulmonary effort is normal. No respiratory distress.   Chest:      Chest wall: No tenderness.   Abdominal:      General: Abdomen is flat. Bowel sounds are normal. There is no distension.      Palpations: Abdomen is soft.      Tenderness: There is no abdominal tenderness.   Musculoskeletal:         General: No swelling, tenderness, deformity or signs of injury.      Cervical back: Normal range of motion and neck supple. No tenderness.   Skin:     General: Skin is warm and dry.      Capillary Refill: Capillary refill takes less than 2 seconds.      Findings: No lesion.   Neurological:      General: No focal deficit present.      Mental Status: She is alert and oriented to  person, place, and time. Mental status is at baseline.   Psychiatric:         Mood and Affect: Mood normal.         Behavior: Behavior normal.         Thought Content: Thought content normal.         Judgment: Judgment normal.         Imaging Review:     Imaging Results              CT Head Without Contrast (Final result)  Result time 08/31/23 12:13:27      Final result by Juliano Green MD (08/31/23 12:13:27)                   Impression:      Questionable trace subarachnoid hemorrhage which is similar to prior.  No sizable intracranial hemorrhage.      Electronically signed by: Juliano Green  Date:    08/31/2023  Time:    12:13               Narrative:    EXAMINATION:  CT HEAD WITHOUT CONTRAST    CLINICAL HISTORY:  Subarachnoid hemorrhage (SAH) suspected;    TECHNIQUE:  CT imaging of the head performed from the skull base to the vertex without intravenous contrast.  mGycm. Automatic exposure control, adjustment of mA/kV or iterative reconstruction technique was used to reduce radiation.    COMPARISON:  Earlier today    FINDINGS:  Questionable trace subarachnoid blood products along a right parietal sulcus image 38 series 7.  This was present on prior.  No sizable intracranial hemorrhage.  There is no new parenchymal attenuation abnormality.  The ventricles are not enlarged.  There are vascular calcifications.    There is right frontal scalp hematoma which is similar to prior.  Partially imaged fracture of the right maxillary sinus with associated inflammatory changes.                                       Lab Review:   CBC:  Recent Labs   Lab Result Units 08/31/23  0903 09/01/23  0553   WBC x10(3)/mcL 10.17 6.70   RBC x10(6)/mcL 2.77* 2.74*   Hgb g/dL 9.8* 9.9*   Hct % 30.8* 29.9*   Platelet x10(3)/mcL 391 304   MCV fL 111.2* 109.1*   MCH pg 35.4* 36.1*   MCHC g/dL 31.8* 33.1       CMP:  Recent Labs   Lab Result Units 08/31/23  0903   Calcium Level Total mg/dL 8.8   Albumin Level g/dL 3.6   Sodium Level  "mmol/L 143   Potassium Level mmol/L 4.7   Carbon Dioxide mmol/L 16*   Blood Urea Nitrogen mg/dL 17.6   Creatinine mg/dL 0.86   Alkaline Phosphatase unit/L 67   Alanine Aminotransferase unit/L 15   Aspartate Aminotransferase unit/L 27   Bilirubin Total mg/dL 0.4       Troponin:  No results for input(s): "TROPONINI" in the last 2160 hours.    ETOH:  No results for input(s): "ETHANOL" in the last 72 hours.     Urine Drug Screen:  No results for input(s): "COCAINE", "OPIATE", "BARBITURATE", "AMPHETAMINE", "FENTANYL", "CANNABINOIDS", "MDMA" in the last 72 hours.    Invalid input(s): "BENZODIAZEPINE", "PHENCYCLIDINE"   Plan:   We will need plan from Neurosurgery for Plavix timing  Await facial trauma recommendations, may need face CT, we will discuss with the attending   Out of bed as able   Labs pending   Keppra for 7 days   Regular diet  Holding Lovenox pending neurosurgery evaluation   Pain well-controlled, did require Dilaudid overnight  Ed Cool NP  c - [phone]    "

## 2023-09-02 VITALS
OXYGEN SATURATION: 89 % | SYSTOLIC BLOOD PRESSURE: 125 MMHG | RESPIRATION RATE: 20 BRPM | DIASTOLIC BLOOD PRESSURE: 78 MMHG | BODY MASS INDEX: 22.58 KG/M2 | HEART RATE: 82 BPM | WEIGHT: 115 LBS | TEMPERATURE: 99 F | HEIGHT: 60 IN

## 2023-09-02 PROCEDURE — 25000003 PHARM REV CODE 250: Performed by: SURGERY

## 2023-09-02 PROCEDURE — 93010 EKG 12-LEAD: ICD-10-PCS | Mod: ,,, | Performed by: INTERNAL MEDICINE

## 2023-09-02 PROCEDURE — 25000003 PHARM REV CODE 250: Performed by: NURSE PRACTITIONER

## 2023-09-02 PROCEDURE — 93005 ELECTROCARDIOGRAM TRACING: CPT

## 2023-09-02 PROCEDURE — 99232 PR SUBSEQUENT HOSPITAL CARE,LEVL II: ICD-10-PCS | Mod: FS,,, | Performed by: SURGERY

## 2023-09-02 PROCEDURE — 25000003 PHARM REV CODE 250

## 2023-09-02 PROCEDURE — 93010 ELECTROCARDIOGRAM REPORT: CPT | Mod: ,,, | Performed by: INTERNAL MEDICINE

## 2023-09-02 PROCEDURE — 99232 SBSQ HOSP IP/OBS MODERATE 35: CPT | Mod: FS,,, | Performed by: SURGERY

## 2023-09-02 RX ORDER — HYDROCODONE BITARTRATE AND ACETAMINOPHEN 5; 325 MG/1; MG/1
1 TABLET ORAL EVERY 6 HOURS PRN
Qty: 7 TABLET | Refills: 0 | Status: SHIPPED | OUTPATIENT
Start: 2023-09-02

## 2023-09-02 RX ORDER — CLOPIDOGREL BISULFATE 75 MG/1
75 TABLET ORAL DAILY
Qty: 30 TABLET | Refills: 11 | Status: SHIPPED | OUTPATIENT
Start: 2023-09-03 | End: 2024-09-02

## 2023-09-02 RX ORDER — METOPROLOL TARTRATE 50 MG/1
50 TABLET ORAL 2 TIMES DAILY
Status: DISCONTINUED | OUTPATIENT
Start: 2023-09-02 | End: 2023-09-02 | Stop reason: HOSPADM

## 2023-09-02 RX ORDER — METOPROLOL TARTRATE 1 MG/ML
5 INJECTION, SOLUTION INTRAVENOUS EVERY 5 MIN PRN
Status: COMPLETED | OUTPATIENT
Start: 2023-09-02 | End: 2023-09-02

## 2023-09-02 RX ORDER — METOPROLOL TARTRATE 50 MG/1
50 TABLET ORAL 2 TIMES DAILY
Qty: 60 TABLET | Refills: 11 | Status: SHIPPED | OUTPATIENT
Start: 2023-09-02 | End: 2024-09-01

## 2023-09-02 RX ORDER — LEVETIRACETAM 500 MG/1
500 TABLET ORAL 2 TIMES DAILY
Qty: 20 TABLET | Refills: 0 | Status: SHIPPED | OUTPATIENT
Start: 2023-09-02 | End: 2023-09-12

## 2023-09-02 RX ADMIN — OXYCODONE HYDROCHLORIDE 5 MG: 5 TABLET ORAL at 09:09

## 2023-09-02 RX ADMIN — METOPROLOL TARTRATE 5 MG: 1 INJECTION, SOLUTION INTRAVENOUS at 01:09

## 2023-09-02 RX ADMIN — OXYCODONE HYDROCHLORIDE 5 MG: 5 TABLET ORAL at 04:09

## 2023-09-02 RX ADMIN — METOPROLOL TARTRATE 50 MG: 50 TABLET, FILM COATED ORAL at 06:09

## 2023-09-02 RX ADMIN — DOCUSATE SODIUM LIQUID 100 MG: 100 LIQUID ORAL at 09:09

## 2023-09-02 RX ADMIN — LEVETIRACETAM 500 MG: 500 TABLET, FILM COATED ORAL at 09:09

## 2023-09-02 RX ADMIN — METOPROLOL TARTRATE 5 MG: 1 INJECTION, SOLUTION INTRAVENOUS at 03:09

## 2023-09-02 RX ADMIN — POLYETHYLENE GLYCOL 3350 17 G: 17 POWDER, FOR SOLUTION ORAL at 09:09

## 2023-09-02 RX ADMIN — CLOPIDOGREL BISULFATE 75 MG: 75 TABLET ORAL at 09:09

## 2023-09-02 NOTE — NURSING
2000pm Late entry. This patient requested some thing for her pain and this nurse brought her a pain pill but she refused it and stated that she wanted something stronger for her pain that is from the fall that she had at home. This nurse noted bruising on her face and a bump behind her head and on the side of her head. This nurse continues to offer assistance to her and give support to her. This patient called 8 times to the nurses desk to ask where her nurses are.at. This nurse gave her reassurance and ecouagement. Fly LANDRY

## 2023-09-02 NOTE — NURSING
"Call received from Ynes RN, Nursing supervisor stating that a phone call was received from the patient (Ms. Cuevas) stating that she never received ICE & that the patient wanted to know if she was "going home"  Nursing supervisor updated on events below.    Walking rounds done at 1430 patient asked nurse if she was "being discharged" nurse notified patient that no discharge orders were present at this time. ICE was brought in to patient per MEGA Calvert at or around 1545 p.m. and patient was updated again that she was not currently being discharged.   "

## 2023-09-02 NOTE — NURSING
"Patient now demanding to be discharged and "go home" after previously refusing to be discharged. Nursing Supervisor notified. Attending providers notified. Discharge instructions reviewed patient verbalized understanding. No distress noted patient SR prior to D/C tele patient denies Pain, discomfort, SOB at present. Prescriptions printed by provider given to patient.   "

## 2023-09-02 NOTE — NURSING
"Call received from the "Trauma Resident" inquiring on patient perferred pharmacy.    Patient stated that she needed a "paper prescription" because she fills her meds in the "[facility] pharmacy"      Resident updated.    [id number] Ynes RN,Charge nurse up to nurses station rounding on patient. Updated on the above, and call log was reviewed reflecting that a call was received from the "Surgery Conference Room" (ext [phone]) from the trauma resident at 1625.    [id number] resident up to nurses station to write d/c prescriptions.       "

## 2023-09-02 NOTE — PROGRESS NOTES
Trauma Surgery   Daily Progress Note     HD#2  POD#* No surgery found *    Subjective  Pain well-controlled.  Bruising around right eye improving.  AFib RVR overnight.  Got IV beta-blocker.  Still in AFib.  Has chronic history of AFib.     Scheduled Meds:   clopidogreL  75 mg Oral Daily    docusate  100 mg Oral BID    levETIRAcetam  500 mg Oral BID    metoprolol tartrate  50 mg Oral BID    polyethylene glycol  17 g Oral BID       Continuous Infusions:    PRN Meds:acetaminophen, acetaminophen, diphenhydrAMINE, HYDROmorphone, LIDOcaine (PF) 10 mg/ml (1%), melatonin, ondansetron, oxyCODONE, sodium chloride 0.9%     Objective  Temp:  [98 °F (36.7 °C)-98.5 °F (36.9 °C)] 98.2 °F (36.8 °C)  Pulse:  [] 131  Resp:  [16-20] 18  SpO2:  [96 %-100 %] 98 %  BP: (122-177)/() 131/88     Gen: NAD, AAOx3  HEENT: EOMI, NCAT, improving bruising around R eye  CV: RR  Resp: no shortness of breath, normal WOB   Abd: soft, non-distended, NT  Ext:  Full ROM. No deformity.      Labs  Recent Labs     08/31/23 0903 09/01/23 0426 09/01/23  0553   WBC 10.17  --  6.70   HGB 9.8*  --  9.9*   HCT 30.8*  --  29.9*     --  304   PTT 30.0  --   --    INR 1.1 1.0  --      Recent Labs     08/31/23 0903 09/01/23  0426    137   K 4.7 4.1   CO2 16* 19*   BUN 17.6 19.1   CREATININE 0.86 0.79   CALCIUM 8.8 8.4   ALBUMIN 3.6  --    BILITOT 0.4  --    AST 27  --    ALKPHOS 67  --    ALT 15  --      Recent Labs     08/31/23  1034   POCTGLUCOSE 72        Imaging  No results found in the last 24 hours.       Assessment/Plan  Consults:   Neurosurgery  Ophthalmology   Plastic Surgery   Therapy:  No indication for therapy Weight bearing status:   RUE: WBAT  LUE: WBAT  RLE: WBAT  LLE: WBAT Precautions:  Fall and Safety   Seizure prophylaxis:  Keppra (day 3/7) VTE prophylaxis:     Home Plavix   GI prophylaxis:  Not indicated. Tolerating ordered diet.   Outpatient follow up:  PCP  Neurosurgery   Optho Disposition:  Home       Cardiac diet    Unable to obtain home medications  Started oral beta-blocker   Continuous telemetry for now   Continue home Plavix   Keppra for 7 days  Stop H2 blocker  cardiology consult if heart rate not improved with beta-blockade  Planning for home today if cardiac issues resolve    Ed Cool  Trauma Surgery   c - [phone]

## 2023-09-02 NOTE — NURSING
2200 The patient wanted dilaudid for her pain ( and it was on her PRN list to be able to take. Some time after the pain medication ( this nurse also gave Zofran for potential nausea). She also had a dose of benadryl for itching. The pain was better and the itching was not as bad but her blood pressure also started going up. Especially when she got up to go to the bathroom and then her heart rate also would go up as well. The doctor came by and ordered an EKG and also new prn orders for high heart rate/rhythm. The patient is also now on ordered telemetry. The charge nurse Claribel and this nurse are keeping and eye out on the patient and also will notify the doctor that came by and checked on this patient as well. Fly MSN, SN, RN

## 2023-09-03 NOTE — DISCHARGE SUMMARY
[facility]  General Surgery  Discharge Summary      Patient Name: Carie Cuevas  MRN: [id number]  Admission Date: 8/31/2023  Hospital Length of Stay: 2 days  Discharge Date and Time:  09/03/2023 8:30 AM  Attending Physician: Cheryl att. providers found   Discharging Provider: ANDRA Perales  Primary Care Provider: Cheryl Primary Doctor    HPI:   No notes on file    * No surgery found *      Indwelling Lines/Drains at time of discharge:   Lines/Drains/Airways     None               Hospital Course: 63-year-old female initially admitted after a fall.  She would a recent fem-fem bypass and was on Plavix.  She was found to have facial fractures and a small head bleed.  She was deemed appropriate to restart her Plavix shortly after admission by Neurosurgery.  Her facial fractures were nonoperative.  She understands the importance of an outpatient follow up with Ophthalmology which we have assisted with setting up.  She was discharged back home.  She will need to see her primary care provider and have her routine follow up for her past medical history.  GCS 15 on the day of discharge.  She did have an episode of AFib RVR which she has a known history of and she would an not been restarted on her home medicines although we had asked her for home medicine reconciliation multiple times that she could not provide.  We would also attempted to contact the pharmacy where she stated she feels medicines were unable to obtain a list.  She was paced on a beta-blocker as standard treatment for atrial fibrillation which immediately resolved both her tachycardia and her atrial fibrillation.  I did offer a prescription for a beta-blocker but she reports she does have metoprolol at home.      Goals of Care Treatment Preferences:  Code Status: Full Code      Consults:   Consults (From admission, onward)        Status Ordering Provider     Inpatient consult to Neurosurgery  Once        Provider:  Jah Haji  MD DENG    Completed KYREE PRESTON          Significant Diagnostic Studies: N/A    Pending Diagnostic Studies:     None        Final Active Diagnoses:    Diagnosis Date Noted POA    PRINCIPAL PROBLEM:  Fall [W19.XXXA] 09/01/2023 Yes    SAH (subarachnoid hemorrhage) [I60.9] 09/01/2023 Yes    Orbital floor fracture [S02.30XA] 09/01/2023 Yes    Maxillary sinus fracture [S02.401A] 09/01/2023 Yes      Problems Resolved During this Admission:      Discharged Condition: good    Disposition: Home or Self Care    Follow Up:   Follow-up Information     Ynes Singh MD Follow up.    Specialty: Ophthalmology  Why: OFFICE WILL CONTACT PATIENT WITH APPOINTMENT DATE / TIME ON TUESDAY 8/2/2023  Contact information:  [facility] [address] [id number] [phone]                       Patient Instructions:      Ambulatory referral/consult to Ophthalmology   Standing Status: Future   Referral Priority: Routine Referral Type: Consultation   Referral Reason: Specialty Services Required   Requested Specialty: Ophthalmology   Number of Visits Requested: 1     Diet Adult Regular     Activity as tolerated     Medications:  Reconciled Home Medications:      Medication List      START taking these medications    clopidogreL 75 mg tablet  Commonly known as: PLAVIX  Take 1 tablet (75 mg total) by mouth once daily.     HYDROcodone-acetaminophen 5-325 mg per tablet  Commonly known as: NORCO  Take 1 tablet by mouth every 6 (six) hours as needed for Pain.     levETIRAcetam 500 MG Tab  Commonly known as: KEPPRA  Take 1 tablet (500 mg total) by mouth 2 (two) times daily. for 10 days     metoprolol tartrate 50 MG tablet  Commonly known as: LOPRESSOR  Take 1 tablet (50 mg total) by mouth 2 (two) times daily.          Time spent on the discharge of patient: 15 minutes    ANDRA Perales  General Surgery  [facility]

## 2023-09-03 NOTE — HOSPITAL COURSE
63-year-old female initially admitted after a fall.  She would a recent fem-fem bypass and was on Plavix.  She was found to have facial fractures and a small head bleed.  She was deemed appropriate to restart her Plavix shortly after admission by Neurosurgery.  Her facial fractures were nonoperative.  She understands the importance of an outpatient follow up with Ophthalmology which we have assisted with setting up.  She was discharged back home.  She will need to see her primary care provider and have her routine follow up for her past medical history.  GCS 15 on the day of discharge.  She did have an episode of AFib RVR which she has a known history of and she would an not been restarted on her home medicines although we had asked her for home medicine reconciliation multiple times that she could not provide.  We would also attempted to contact the pharmacy where she stated she feels medicines were unable to obtain a list.  She was paced on a beta-blocker as standard treatment for atrial fibrillation which immediately resolved both her tachycardia and her atrial fibrillation.  I did offer a prescription for a beta-blocker but she reports she does have metoprolol at home.